# Patient Record
Sex: FEMALE | Race: WHITE | Employment: OTHER | URBAN - METROPOLITAN AREA
[De-identification: names, ages, dates, MRNs, and addresses within clinical notes are randomized per-mention and may not be internally consistent; named-entity substitution may affect disease eponyms.]

---

## 2017-04-21 DIAGNOSIS — E04.1 THYROID NODULE: ICD-10-CM

## 2017-04-21 DIAGNOSIS — D11.0 PLEOMORPHIC ADENOMA OF PAROTID GLAND: Primary | ICD-10-CM

## 2017-05-31 ENCOUNTER — OFFICE VISIT (OUTPATIENT)
Dept: OTOLARYNGOLOGY | Facility: CLINIC | Age: 62
End: 2017-05-31

## 2017-05-31 DIAGNOSIS — D11.0 PLEOMORPHIC ADENOMA OF PAROTID GLAND: Primary | ICD-10-CM

## 2017-05-31 DIAGNOSIS — E04.1 THYROID NODULE: ICD-10-CM

## 2017-05-31 ASSESSMENT — PAIN SCALES - GENERAL: PAINLEVEL: NO PAIN (0)

## 2017-05-31 NOTE — PATIENT INSTRUCTIONS
1.  You were seen in the ENT Clinic today by Dr. Devlin.  If you have any questions or concerns after your appointment, please call [phone].  Press option #1 for scheduling related needs.  Press option #3 for Nurse advice.      Larissa HARRISN, RN  [facility] ENT   Head & Neck Surgery

## 2017-05-31 NOTE — MR AVS SNAPSHOT
After Visit Summary   2017    Moon Steve    MRN: [id number]           Patient Information     Date Of Birth          1955        Visit Information        Provider Department      2017 8:20 AM Vini Devlin MD [facility] Ear Nose and Throat        Today's Diagnoses     Pleomorphic adenoma of parotid gland    -  1    Thyroid nodule          Care Instructions    1.  You were seen in the ENT Clinic today by Dr. Devlin.  If you have any questions or concerns after your appointment, please call [phone].  Press option #1 for scheduling related needs.  Press option #3 for Nurse advice.      Larissa ARRINGTON, RN  [facility] ENT   Head & Neck Surgery               Follow-ups after your visit        Who to contact     Please call your clinic at [phone] to:    Ask questions about your health    Make or cancel appointments    Discuss your medicines    Learn about your test results    Speak to your doctor   If you have compliments or concerns about an experience at your clinic, or if you wish to file a complaint, please contact [facility] Patient Relations at [phone] or email us at [email or web address]         Additional Information About Your Visit        MyChart Information     [employer] is an electronic gateway that provides easy, online access to your medical records. With [employer], you can request a clinic appointment, read your test results, renew a prescription or communicate with your care team.     To sign up for [employer] visit the website at [email or web address]   You will be asked to enter the access code listed below, as well as some personal information. Please follow the directions to create your username and password.     Your access code is: [email or web address]  Expires: 2017  6:30 AM     Your access code will  in 90 days. If you need help or a new code, please contact your [facility]  Clinic or call [phone] for assistance.        Care EveryWhere ID     This is your Care EveryWhere ID. This could be used by other organizations to access your [facility] medical records  [id number]         Blood Pressure from Last 3 Encounters:   05/31/11 133/91    Weight from Last 3 Encounters:   03/23/16 59 kg (130 lb 1 oz)   03/18/15 58.6 kg (129 lb 1.6 oz)   03/05/14 58.3 kg (128 lb 9 oz)              Today, you had the following     No orders found for display         Today's Medication Changes          These changes are accurate as of: 5/31/17 11:59 PM.  If you have any questions, ask your nurse or doctor.               Stop taking these medicines if you haven't already. Please contact your care team if you have questions.     ERYTHROMYCIN BASE PO   Stopped by:  Vini Devlin MD                    Primary Care Provider Office Phone # Fax #    Heidi Cevallos [phone]       [facility] [place] [address] [place] [address]        Thank you!     Thank you for choosing [facility] EAR NOSE AND THROAT  for your care. Our goal is always to provide you with excellent care. Hearing back from our patients is one way we can continue to improve our services. Please take a few minutes to complete the written survey that you may receive in the mail after your visit with us. Thank you!             Your Updated Medication List - Protect others around you: Learn how to safely use, store and throw away your medicines at www.disposemymeds.org.          This list is accurate as of: 5/31/17 11:59 PM.  Always use your most recent med list.                   Brand Name Dispense Instructions for use    * multivitamin, therapeutic with minerals Tabs tablet      Take 1 tablet by mouth daily       * PRESERVISION AREDS PO      Take  by mouth. Take as Directed       PREVACID 30 MG CR capsule   Generic drug:  LANsoprazole      Take  by mouth daily.       simvastatin 20 MG tablet    ZOCOR     Take 20 mg by  mouth At Bedtime.       * Notice:  This list has 2 medication(s) that are the same as other medications prescribed for you. Read the directions carefully, and ask your doctor or other care provider to review them with you.

## 2017-05-31 NOTE — LETTER
5/31/2017       RE: Moon Steve  [address] [place] [address]     Dear Colleague,    Thank you for referring your patient, Moon Steve, to the [facility] EAR NOSE AND THROAT at [facility]. Please see a copy of my visit note below.    May 31, 2017    PRIOR ONCOLOGIC HISTORY:  Ms. Steve is status post completion of postoperative radiotherapy in March 2011 for a recurrent pleomorphic adenoma.  I had performed a revision right-sided parotidectomy on 12/02/2010 which had invasion of the tumor into the dermis.      HISTORY OF PRESENT ILLNESS:    seven years out and doing great.  She is golfing almost daily.  She is very much enjoying life right now.  Her  is here with her as well.  She had a thyroid ultrasound done yesterday that shows no change in the 7 mm mass for at least two years now.  This is stable.  Her neck CT was also done and demonstrates no evidence of recurrence, and there is no evidence of cervical metastasis.      PHYSICAL EXAMINATION:  She is in good spirits and looks great.  Her  was with her.  The parotid bed continues to be soft with prominent scarring and posterior angular depression.  However, this does not bother her.  The parotid beds are clear bilaterally.  The neck has no evidence of adenopathy.  Both ears were inspected and were clear as well.  There are no palpable nodes in the thyroid.      IMPRESSION AND PLAN:  Ms. Steve continues to do well at [age] years.  We will see her back in two to three years' time with a repeat set of scans.  We do not necessarily need to repeat the carotid ultrasound since she had it done previously, and there were no issues at five years.          Vini Devlin MD  Otolaryngology/Head & Neck Surgery  [phone]    cc:   Kevon Liu MD   [facility]   [address] [facility] [place]  [address]     Heidi Diaz    [facility]    [address] [place]  [address]

## 2017-05-31 NOTE — PROGRESS NOTES
May 31, 2017    PRIOR ONCOLOGIC HISTORY:  Ms. Steve is status post completion of postoperative radiotherapy in March 2011 for a recurrent pleomorphic adenoma.  I had performed a revision right-sided parotidectomy on 12/02/2010 which had invasion of the tumor into the dermis.      HISTORY OF PRESENT ILLNESS:    seven years out and doing great.  She is golfing almost daily.  She is very much enjoying life right now.  Her  is here with her as well.  She had a thyroid ultrasound done yesterday that shows no change in the 7 mm mass for at least two years now.  This is stable.  Her neck CT was also done and demonstrates no evidence of recurrence, and there is no evidence of cervical metastasis.      PHYSICAL EXAMINATION:  She is in good spirits and looks great.  Her  was with her.  The parotid bed continues to be soft with prominent scarring and posterior angular depression.  However, this does not bother her.  The parotid beds are clear bilaterally.  The neck has no evidence of adenopathy.  Both ears were inspected and were clear as well.  There are no palpable nodes in the thyroid.      IMPRESSION AND PLAN:  Ms. Steve continues to do well at [age] years.  We will see her back in two to three years' time with a repeat set of scans.  We do not necessarily need to repeat the carotid ultrasound since she had it done previously, and there were no issues at five years.          Vini Devlin MD  Otolaryngology/Head & Neck Surgery  [phone]              cc:   Kevon Liu MD   [facility] [address] [place]  [address]     Heidi Diaz    [facility] [address] [place]  [address]

## 2017-06-27 LAB — MAMMOGRAM: NORMAL

## 2018-04-16 ENCOUNTER — TELEPHONE (OUTPATIENT)
Dept: OTOLARYNGOLOGY | Facility: CLINIC | Age: 63
End: 2018-04-16

## 2018-04-16 DIAGNOSIS — D11.0 PLEOMORPHIC ADENOMA OF PAROTID GLAND: Primary | ICD-10-CM

## 2018-04-16 NOTE — TELEPHONE ENCOUNTER
[facility] Call Center    Phone Message    May a detailed message be left on voicemail: yes    Reason for Call: Order(s): Other:   Reason for requested: CT of neck and US of thyroid, pt wants to know if she'll need to get these images for her yearly visit this year with Dr. Devlin in June.  Date needed: By June   Provider name: Dr. Devlin      Action Taken: ENT

## 2018-04-17 NOTE — TELEPHONE ENCOUNTER
"Dr Devlin's last note as is follows from 5/2017:  \"Ms. Steve continues to do well at [age] years.  We will see her back in two to three years' time with a repeat set of scans.  We do not necessarily need to repeat the carotid ultrasound since she had it done previously, and there were no issues at five years. \"    Left message for patient regarding Dr Devlin's recommendations of last year, a so she doesn't need to RTC this year for scans, these can be done next year( 2 years) or at 3 years. Also that we would place orders if there had been any changes, could certainly get scans done sooner.  Also reviewed care coordination now done by Marya Gipson.    Spoke with patient and she understands. She will likely come in in June with Dr Devlin, for exam only as scheduled.  She has no symptoms nor concerns.  "

## 2018-06-06 ENCOUNTER — EXTERNAL ORDER RESULTS (OUTPATIENT)
Dept: CARE COORDINATION | Facility: CLINIC | Age: 63
End: 2018-06-06

## 2018-06-13 ENCOUNTER — OFFICE VISIT (OUTPATIENT)
Dept: OTOLARYNGOLOGY | Facility: CLINIC | Age: 63
End: 2018-06-13
Payer: COMMERCIAL

## 2018-06-13 VITALS — HEIGHT: 65 IN | WEIGHT: 130 LBS | BODY MASS INDEX: 21.66 KG/M2

## 2018-06-13 DIAGNOSIS — D11.0 PLEOMORPHIC ADENOMA OF PAROTID GLAND: Primary | ICD-10-CM

## 2018-06-13 ASSESSMENT — PAIN SCALES - GENERAL: PAINLEVEL: NO PAIN (0)

## 2018-06-13 NOTE — PROGRESS NOTES
June 13, 2018        PRIOR ONCOLOGIC HISTORY:  Ms. Steve is status post completion of postoperative radiotherapy in March 2011 for a recurrent pleomorphic adenoma.  I had performed a revision right-sided parotidectomy on 12/02/2010 which had invasion of the tumor into the dermis.      HISTORY OF PRESENT ILLNESS:    Ms. Steve is doing well.  She is getting ready to go golfing this afternoon.  She and her  just purchased a new home in [place], or in the process of moving in.     She is here today primarily to make sure that her symptoms are not to be concerned about.  She notices occasionally that she will have a charley horse in the right side of the neck and she points to her sternocleidomastoid muscle.      She does not notice any new lumps or bumps.  She has been swallowing well.  There are no other concerns.  She wants to know whether or not we should get a CT scan.      PHYSICAL EXAMINATION:  She is accompanied by her  today.  They are both in good spirits.  Her parotid bed is completely clear.  The neck has no evidence of adenopathy.  The upper portion of the SCM muscles a little bit more prominent and a little bit more scar but I feel no masses.  The rest of the neck is negative.  Her facial nerve is working beautifully.  Both ears are clear.  The tympanic membranes are clear bilaterally.     IMPRESSION AND PLAN:    Ms. Steve is now 8 years out and JORDAN.   We will scan her in 1-2 years.  We talked about neck stretching exercises to avoid the charley horses.  I will see her back in one year's time as long as there are no other issues.       Vini Devlin MD  Otolaryngology/Head & Neck Surgery  [phone]              cc:   Kevon Liu MD   [facility]   [address] [place]  [address]     Heidi Cevallos-Tonie    [facility]    [address] [place]  [address]

## 2018-06-13 NOTE — MR AVS SNAPSHOT
"              After Visit Summary   2018    Moon Steve    MRN: [id number]           Patient Information     Date Of Birth          1955        Visit Information        Provider Department      2018 8:20 AM Vini Devlin MD [facility] Ear Nose and Throat        Today's Diagnoses     Pleomorphic adenoma of parotid gland    -  1      Care Instructions    Follow up with Dr. devlin in 1 year  Call the ENt clinic if ?'s arise           Follow-ups after your visit        Who to contact     Please call your clinic at [phone] to:    Ask questions about your health    Make or cancel appointments    Discuss your medicines    Learn about your test results    Speak to your doctor            Additional Information About Your Visit        MyChart Information     [employer] is an electronic gateway that provides easy, online access to your medical records. With [employer], you can request a clinic appointment, read your test results, renew a prescription or communicate with your care team.     To sign up for [employer] visit the website at [email or web address]   You will be asked to enter the access code listed below, as well as some personal information. Please follow the directions to create your username and password.     Your access code is: [email or web address]  Expires: 2018 10:35 AM     Your access code will  in 90 days. If you need help or a new code, please contact your [facility] or call [phone] for assistance.        Care EveryWhere ID     This is your Care EveryWhere ID. This could be used by other organizations to access your [place] medical records  [id number]        Your Vitals Were     Height BMI (Body Mass Index)                1.651 m (5' 5\") 21.63 kg/m2           Blood Pressure from Last 3 Encounters:   11 133/91    Weight from Last 3 Encounters:   18 59 kg (130 lb)   16 59 kg (130 lb 1 oz)   03/18/15 58.6 kg (129 lb 1.6 oz)            "   Today, you had the following     No orders found for display       Primary Care Provider Office Phone # Fax #    Heidi Cevallos [phone]       [facility] [address] [address]        Equal Access to Services     JASONLAKHWINDER SUZAN : Hadii aad ku hadasho Soomaali, waaxda luqadaha, qaybta kaalmada adeegyada, waxay anthonyin hayariann adezachery juanito rivka gaines. So [facility] [phone].    ATENCIÓN: Si habla español, tiene a kyle disposición servicios gratuitos de asistencia lingüística. Llame al [phone].    We comply with applicable federal civil rights laws and [place] laws. We do not discriminate on the basis of race, color, national origin, age, disability, sex, sexual orientation, or gender identity.            Thank you!     Thank you for choosing [facility] EAR NOSE AND THROAT  for your care. Our goal is always to provide you with excellent care. Hearing back from our patients is one way we can continue to improve our services. Please take a few minutes to complete the written survey that you may receive in the mail after your visit with us. Thank you!             Your Updated Medication List - Protect others around you: Learn how to safely use, store and throw away your medicines at www.disposemymeds.org.          This list is accurate as of 6/13/18  8:35 AM.  Always use your most recent med list.                   Brand Name Dispense Instructions for use Diagnosis    * multivitamin, therapeutic with minerals Tabs tablet      Take 1 tablet by mouth daily    Parotid adenoma       * PRESERVISION AREDS PO      Take  by mouth. Take as Directed        PREVACID 30 MG CR capsule   Generic drug:  LANsoprazole      Take  by mouth daily.        simvastatin 20 MG tablet    ZOCOR     Take 20 mg by mouth At Bedtime.        * Notice:  This list has 2 medication(s) that are the same as other medications prescribed for you. Read the directions carefully, and ask your doctor or other care  provider to review them with you.

## 2018-06-13 NOTE — LETTER
6/13/2018       RE: Moon Steve  [address]  [place] [address]     Dear Colleague,    Thank you for referring your patient, Moon Steve, to the [facility] EAR NOSE AND THROAT at [facility]. Please see a copy of my visit note below.    June 13, 2018        PRIOR ONCOLOGIC HISTORY:  Ms. Steve is status post completion of postoperative radiotherapy in March 2011 for a recurrent pleomorphic adenoma.  I had performed a revision right-sided parotidectomy on 12/02/2010 which had invasion of the tumor into the dermis.      HISTORY OF PRESENT ILLNESS:    Ms. Steve is doing well.  She is getting ready to go golfing this afternoon.  She and her  just purchased a new home in [place], or in the process of moving in.     She is here today primarily to make sure that her symptoms are not to be concerned about.  She notices occasionally that she will have a charley horse in the right side of the neck and she points to her sternocleidomastoid muscle.      She does not notice any new lumps or bumps.  She has been swallowing well.  There are no other concerns.  She wants to know whether or not we should get a CT scan.      PHYSICAL EXAMINATION:  She is accompanied by her  today.  They are both in good spirits.  Her parotid bed is completely clear.  The neck has no evidence of adenopathy.  The upper portion of the SCM muscles a little bit more prominent and a little bit more scar but I feel no masses.  The rest of the neck is negative.  Her facial nerve is working beautifully.  Both ears are clear.  The tympanic membranes are clear bilaterally.     IMPRESSION AND PLAN:    Ms. Steve is now 8 years out and JORDAN.   We will scan her in 1-2 years.  We talked about neck stretching exercises to avoid the charley horses.  I will see her back in one year's time as long as there are no other issues.       Vini Devlin MD  Otolaryngology/Head & Neck  Surgery  [phone]    cc:   Kevon Liu MD   [facility]   [address] [facility] [place]  [address]     Heidi Diaz    [facility]    [address] [place]  [address]

## 2018-06-13 NOTE — NURSING NOTE
Chief Complaint   Patient presents with     RECHECK     Routine thyroid check     Jordy Denton EMT

## 2019-05-22 ENCOUNTER — TELEPHONE (OUTPATIENT)
Dept: OTOLARYNGOLOGY | Facility: CLINIC | Age: 64
End: 2019-05-22

## 2019-05-22 DIAGNOSIS — E04.1 THYROID NODULE: Primary | ICD-10-CM

## 2019-05-22 NOTE — TELEPHONE ENCOUNTER
Left vm message for patient advising her that orders for a thyroid ultrasound were put in and she can reach out to radiology to schedule. Phone number for radiology scheduling as well as our call back number left on voicemail.

## 2019-05-22 NOTE — TELEPHONE ENCOUNTER
[facility] Call Center    Phone Message    May a detailed message be left on voicemail: yes    Reason for Call: Other: pt request orders for her annual thyroid check. she plans to meet with Dr. Devlin in July 2019. Please call pt when orders have been made.      Action Taken: Message routed to:  [facility] ent

## 2019-06-10 ENCOUNTER — DOCUMENTATION ONLY (OUTPATIENT)
Dept: CARE COORDINATION | Facility: CLINIC | Age: 64
End: 2019-06-10

## 2019-07-08 ENCOUNTER — ANCILLARY PROCEDURE (OUTPATIENT)
Dept: ULTRASOUND IMAGING | Facility: CLINIC | Age: 64
End: 2019-07-08
Attending: OTOLARYNGOLOGY
Payer: COMMERCIAL

## 2019-07-08 DIAGNOSIS — E04.1 THYROID NODULE: ICD-10-CM

## 2019-07-10 ENCOUNTER — OFFICE VISIT (OUTPATIENT)
Dept: OTOLARYNGOLOGY | Facility: CLINIC | Age: 64
End: 2019-07-10
Payer: COMMERCIAL

## 2019-07-10 VITALS
SYSTOLIC BLOOD PRESSURE: 173 MMHG | BODY MASS INDEX: 21.49 KG/M2 | HEART RATE: 65 BPM | TEMPERATURE: 98.1 F | DIASTOLIC BLOOD PRESSURE: 87 MMHG | WEIGHT: 129 LBS | HEIGHT: 65 IN | RESPIRATION RATE: 20 BRPM

## 2019-07-10 DIAGNOSIS — D11.0 PLEOMORPHIC ADENOMA OF PAROTID GLAND: ICD-10-CM

## 2019-07-10 DIAGNOSIS — E04.1 THYROID NODULE: Primary | ICD-10-CM

## 2019-07-10 ASSESSMENT — PAIN SCALES - GENERAL: PAINLEVEL: NO PAIN (0)

## 2019-07-10 ASSESSMENT — MIFFLIN-ST. JEOR: SCORE: 1124.14

## 2019-07-10 NOTE — PATIENT INSTRUCTIONS
1. You were seen in the ENT Clinic today by Dr. Devlin.  If you have any questions or concerns after your appointment, please call   - Option 1: ENT Clinic: [phone]  - Option 2: Sarbjit (Dr. Devlin's Nurse): [phone]    2. Plan to return to clinic in 1-2 years     Natice Schwab, RN  [facility] Otolaryngology  [phone]

## 2019-07-10 NOTE — LETTER
7/10/2019       RE: Moon Steve  [address]  [place] [address]     Dear Colleague,    Thank you for referring your patient, Moon Steve, to the [facility] EAR NOSE AND THROAT at [facility]. Please see a copy of my visit note below.    Jul 10, 2019        PRIOR ONCOLOGIC HISTORY:  Ms. Steve is status post completion of postoperative radiotherapy in March 2011 for a recurrent pleomorphic adenoma.  I had performed a revision right-sided parotidectomy on 12/02/2010 which had invasion of the tumor into the dermis.      HISTORY OF PRESENT ILLNESS:    Ms. Steve continues to do well.  There have been no difficulties recently.  She had an ultrasound scan yesterday that showed no change in her small nodules throughout her thyroid over the past three to four years.      She feels like the charley horses that she had at her last visit have improved.  She does not notice any new symptoms.  She has not had any dysphagia or pain or new lumps or bumps.     PHYSICAL EXAMINATION:  On examination, she is accompanied by her .  They are in good spirits as usual.  She is very thin.  It is easy to examine her parotid bed which is clear. The thyroid bed does not show any palpable masses.  The neck has no evidence of adenopathy.  She asked me to take a look at her ears and both ears are clean with clear tympanic membranes and external auditory canals.      IMPRESSION AND PLAN:  Ms. Steve is now nearly nine years out and JORDAN.  I told her that we do not have to scan her every year and that we can go back to probably every two to three years.  She will follow up with me in two years.  If there are any issues sooner than that, they were introduced to Natice Schwab so that they can contact her.       Vini Devlin MD  Otolaryngology/Head & Neck Surgery  [phone]    cc:   Kevon Liu MD   [facility]   [address] [place]   [address]     [facility]    [address] [place]  [address]         Again, thank you for allowing me to participate in the care of your patient.      Sincerely,    Vini Devlin MD

## 2019-07-10 NOTE — NURSING NOTE
"Chief Complaint   Patient presents with     RECHECK     yearly thyroid check     Blood pressure 173/87, pulse 65, temperature 98.1  F (36.7  C), resp. rate 20, height 1.64 m (5' 4.57\"), weight 58.5 kg (129 lb).    Obdulio Marshall LPN    "

## 2019-07-10 NOTE — PROGRESS NOTES
Jul 10, 2019        PRIOR ONCOLOGIC HISTORY:  Ms. Steve is status post completion of postoperative radiotherapy in March 2011 for a recurrent pleomorphic adenoma.  I had performed a revision right-sided parotidectomy on 12/02/2010 which had invasion of the tumor into the dermis.      HISTORY OF PRESENT ILLNESS:    Ms. Steve continues to do well.  There have been no difficulties recently.  She had an ultrasound scan yesterday that showed no change in her small nodules throughout her thyroid over the past three to four years.      She feels like the charley horses that she had at her last visit have improved.  She does not notice any new symptoms.  She has not had any dysphagia or pain or new lumps or bumps.     PHYSICAL EXAMINATION:  On examination, she is accompanied by her .  They are in good spirits as usual.  She is very thin.  It is easy to examine her parotid bed which is clear. The thyroid bed does not show any palpable masses.  The neck has no evidence of adenopathy.  She asked me to take a look at her ears and both ears are clean with clear tympanic membranes and external auditory canals.      IMPRESSION AND PLAN:  Ms. Steve is now nearly nine years out and JORDAN.  I told her that we do not have to scan her every year and that we can go back to probably every two to three years.  She will follow up with me in two years.  If there are any issues sooner than that, they were introduced to Natice Schwab so that they can contact her.       Vini Devlin MD  Otolaryngology/Head & Neck Surgery  [phone]              cc:   Kevon Liu MD   [facility] [address] [place]  [address]     Heidi Diaz    [facility]    [address] [place]  [address]

## 2021-07-14 DIAGNOSIS — E04.1 THYROID NODULE: Primary | ICD-10-CM

## 2021-07-15 ENCOUNTER — TELEPHONE (OUTPATIENT)
Dept: OTOLARYNGOLOGY | Facility: CLINIC | Age: 66
End: 2021-07-15

## 2021-08-03 ENCOUNTER — HOSPITAL ENCOUNTER (OUTPATIENT)
Dept: ULTRASOUND IMAGING | Facility: CLINIC | Age: 66
Discharge: HOME | End: 2021-08-03
Attending: OTOLARYNGOLOGY | Admitting: OTOLARYNGOLOGY
Payer: COMMERCIAL

## 2021-08-03 DIAGNOSIS — E04.1 THYROID NODULE: ICD-10-CM

## 2021-08-03 PROCEDURE — 76536 US EXAM OF HEAD AND NECK: CPT

## 2021-08-10 ENCOUNTER — OFFICE VISIT (OUTPATIENT)
Dept: OTOLARYNGOLOGY | Facility: CLINIC | Age: 66
End: 2021-08-10
Payer: COMMERCIAL

## 2021-08-10 VITALS
WEIGHT: 123.46 LBS | HEIGHT: 65 IN | HEART RATE: 80 BPM | BODY MASS INDEX: 20.57 KG/M2 | TEMPERATURE: 97.5 F | OXYGEN SATURATION: 97 %

## 2021-08-10 DIAGNOSIS — E04.1 THYROID NODULE: Primary | ICD-10-CM

## 2021-08-10 DIAGNOSIS — D11.0 BENIGN NEOPLASM PAROTID GLAND: ICD-10-CM

## 2021-08-10 PROCEDURE — 99213 OFFICE O/P EST LOW 20 MIN: CPT | Performed by: REGISTERED NURSE

## 2021-08-10 RX ORDER — LOSARTAN POTASSIUM 50 MG/1
50 TABLET ORAL
COMMUNITY
Start: 2021-07-13 | End: 2022-07-13

## 2021-08-10 RX ORDER — HYDROCHLOROTHIAZIDE 12.5 MG/1
12.5 TABLET ORAL
COMMUNITY
Start: 2021-07-13 | End: 2022-07-13

## 2021-08-10 ASSESSMENT — MIFFLIN-ST. JEOR: SCORE: 1095.88

## 2021-08-10 ASSESSMENT — PAIN SCALES - GENERAL: PAINLEVEL: NO PAIN (0)

## 2021-08-10 NOTE — PATIENT INSTRUCTIONS
- You were seen in the ENT Clinic today by Sylvia Ernst NP.   - Follow up in 1 year for continued surveillance.  - Please send a [employer] message or call the ENT clinic at [phone] with any questions or concerns.

## 2021-08-10 NOTE — NURSING NOTE
"Chief Complaint   Patient presents with     RECHECK     follow up//former Dr. Devlin pt       Pulse 80, temperature 97.5  F (36.4  C), temperature source Temporal, height 1.651 m (5' 5\"), weight 56 kg (123 lb 7.3 oz), SpO2 97 %.    Elza Martinez, EMT    "

## 2021-08-10 NOTE — LETTER
8/10/2021       RE: Moon Steve  [address]  [place] [address]     Dear Colleague,    Thank you for referring your patient, Moon Steve, to the [facility] [place] at [facility]. Please see a copy of my visit note below.    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 Patient had a revision right-sided parotidectomy by Dr. Devlin on 12/02/2010. There was invasion into the dermis at this time. Patient completed postoperative radiation in March 2011. She continues to follow annually with last CT scan in 2017. Dr. Devlin has been following thyroid nodules with every 2-3 year ultrasounds. Most recent US was on 8/3/21 which reported indeterminate thyroid nodules that have not changed from previous imaging.    Interval History:   Patient comes in today with her  for routine surveillance. States that she is doing well. Continues to have xerostomia but it is not affecting her ability to swallow or take oral intake. Patient does state that she is less interested in food but that is not due to radiation side effects.     Patient denies any dysphagia, odynophagia, new sore throat, mouth pain, ear pain, bleeding from the mouth, hemoptysis, voice changes, facial numbness/weakness or lumps/bumps of the neck.    Past Medical History:  No past medical history on file.    Past Surgical History:  No past surgical history on file.    Medications:  Current Outpatient Medications   Medication Sig Dispense Refill     hydrochlorothiazide (HYDRODIURIL) 12.5 MG tablet Take 12.5 mg by mouth       LANsoprazole (PREVACID) 30 MG capsule Take  by mouth daily.       losartan (COZAAR) 50 MG tablet Take 50 mg by mouth       Multiple Vitamins-Minerals (ICAPS AREDS 2 PO)        Multiple Vitamins-Minerals (PRESERVISION AREDS PO)  "Take  by mouth. Take as Directed       multivitamin, therapeutic with minerals (THERA-VIT-M) TABS Take 1 tablet by mouth daily       simvastatin (ZOCOR) 20 MG tablet Take 20 mg by mouth At Bedtime.       Allergies:  No Known Allergies     Social History:  Social History     Tobacco Use     Smoking status: Never Smoker     Smokeless tobacco: Never Used   Substance Use Topics     Alcohol use: Yes     Comment: few times per week     Drug use: Not on file     ROS: 10 point ROS neg other than the symptoms noted above in the HPI.    Physical Exam:    Pulse 80   Temp 97.5  F (36.4  C) (Temporal)   Ht 1.651 m (5' 5\")   Wt 56 kg (123 lb 7.3 oz)   SpO2 97%   BMI 20.54 kg/m    Wt Readings from Last 3 Encounters:   08/10/21 56 kg (123 lb 7.3 oz)   07/10/19 58.5 kg (129 lb)   18 59 kg (130 lb)        Constitutional:  The patient was well-groomed and in no acute distress.     Skin: Normal:  warm and pink without rash    Neurologic: Alert and oriented x 3.  CN's III-XII within normal limits.  Voice normal.    Psychiatric: The patient's affect was calm, cooperative, and appropriate.     Communication:  Normal; communicates verbally, normal voice quality.    Respiratory: Breathing comfortably without stridor or exertion of accessory muscles.    Head/Face:  Normocephalic and atraumatic.  Right parotidectomy incision is well healed without masses on palpation.   Oral Cavity: Normal tongue, floor of mouth, buccal mucosa, and palate.  No lesions or masses on inspection or palpation. *   Oropharynx: Normal mucosa, palate symmetric with normal elevation. No abnormal lymph tissue in the oropharynx.   Neck: Supple with normal laryngeal and tracheal landmarks.  The parotid beds were without masses.  No palpable thyroid.  Normal range of motion.    Lymphatic: There is no palpable lymphadenopathy in the neck.      Labs and Imaging Reviewed:  Imagin/3/21  US Thyroid  IMPRESSION:  1.  A single subcentimeter indeterminant thyroid " nodule. No  significant change from previous.  2.  No FNA indicated.    Assessment/Plan:  1. 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 She is doing well without any evidence of disease on today's exam. Discussed further surveillance including optional annual clinic visits which she would like to continue. Will not plan for any further imaging unless new symptoms occur including any new lumps/bumps, numbness/weakness of face, ear pain or change in swallowing.    2. Thyroid nodule  Patient with history of indeterminate thyroid nodules that have remained stable over multiple years. No further imaging needed at this time.       Reviewed signs and symptoms that would necessitate a sooner exam including new sore throat, mouth pain, ear pain, dysphagia, bleeding from the mouth or lumps/bumps of the neck.    Otherwise, patient will follow up in 1 year for continued surveillance.    Sylvia Ernst DNP, APRN, CNP  Otolaryngology  Head & Neck Surgery  [phone]    30 minutes spent on the date of the encounter doing chart review, history and exam, documentation and further activities per the note        Again, thank you for allowing me to participate in the care of your patient.      Sincerely,    Sarah Ernst, NP

## 2021-08-16 NOTE — PROGRESS NOTES
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 Patient had a revision right-sided parotidectomy by Dr. Devlin on 12/02/2010. There was invasion into the dermis at this time. Patient completed postoperative radiation in March 2011. She continues to follow annually with last CT scan in 2017. Dr. Devlin has been following thyroid nodules with every 2-3 year ultrasounds. Most recent US was on 8/3/21 which reported indeterminate thyroid nodules that have not changed from previous imaging.    Interval History:   Patient comes in today with her  for routine surveillance. States that she is doing well. Continues to have xerostomia but it is not affecting her ability to swallow or take oral intake. Patient does state that she is less interested in food but that is not due to radiation side effects.     Patient denies any dysphagia, odynophagia, new sore throat, mouth pain, ear pain, bleeding from the mouth, hemoptysis, voice changes, facial numbness/weakness or lumps/bumps of the neck.    Past Medical History:  No past medical history on file.    Past Surgical History:  No past surgical history on file.    Medications:  Current Outpatient Medications   Medication Sig Dispense Refill     hydrochlorothiazide (HYDRODIURIL) 12.5 MG tablet Take 12.5 mg by mouth       LANsoprazole (PREVACID) 30 MG capsule Take  by mouth daily.       losartan (COZAAR) 50 MG tablet Take 50 mg by mouth       Multiple Vitamins-Minerals (ICAPS AREDS 2 PO)        Multiple Vitamins-Minerals (PRESERVISION AREDS PO) Take  by mouth. Take as Directed       multivitamin, therapeutic with minerals (THERA-VIT-M) TABS Take 1 tablet by mouth daily       simvastatin (ZOCOR) 20 MG tablet Take 20 mg by mouth At Bedtime.       Allergies:  No Known Allergies     Social History:  Social History     Tobacco Use     Smoking status: Never Smoker     Smokeless  "tobacco: Never Used   Substance Use Topics     Alcohol use: Yes     Comment: few times per week     Drug use: Not on file     ROS: 10 point ROS neg other than the symptoms noted above in the HPI.    Physical Exam:    Pulse 80   Temp 97.5  F (36.4  C) (Temporal)   Ht 1.651 m (5' 5\")   Wt 56 kg (123 lb 7.3 oz)   SpO2 97%   BMI 20.54 kg/m    Wt Readings from Last 3 Encounters:   08/10/21 56 kg (123 lb 7.3 oz)   07/10/19 58.5 kg (129 lb)   18 59 kg (130 lb)        Constitutional:  The patient was well-groomed and in no acute distress.     Skin: Normal:  warm and pink without rash    Neurologic: Alert and oriented x 3.  CN's III-XII within normal limits.  Voice normal.    Psychiatric: The patient's affect was calm, cooperative, and appropriate.     Communication:  Normal; communicates verbally, normal voice quality.    Respiratory: Breathing comfortably without stridor or exertion of accessory muscles.    Head/Face:  Normocephalic and atraumatic.  Right parotidectomy incision is well healed without masses on palpation.   Oral Cavity: Normal tongue, floor of mouth, buccal mucosa, and palate.  No lesions or masses on inspection or palpation. *   Oropharynx: Normal mucosa, palate symmetric with normal elevation. No abnormal lymph tissue in the oropharynx.   Neck: Supple with normal laryngeal and tracheal landmarks.  The parotid beds were without masses.  No palpable thyroid.  Normal range of motion.    Lymphatic: There is no palpable lymphadenopathy in the neck.      Labs and Imaging Reviewed:  Imagin/3/21  US Thyroid  IMPRESSION:  1.  A single subcentimeter indeterminant thyroid nodule. No  significant change from previous.  2.  No FNA indicated.    Assessment/Plan:  1. 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 She is doing well without any evidence of disease on today's exam. Discussed further surveillance " including optional annual clinic visits which she would like to continue. Will not plan for any further imaging unless new symptoms occur including any new lumps/bumps, numbness/weakness of face, ear pain or change in swallowing.    2. Thyroid nodule  Patient with history of indeterminate thyroid nodules that have remained stable over multiple years. No further imaging needed at this time.       Reviewed signs and symptoms that would necessitate a sooner exam including new sore throat, mouth pain, ear pain, dysphagia, bleeding from the mouth or lumps/bumps of the neck.    Otherwise, patient will follow up in 1 year for continued surveillance.    Sylvia Ernst DNP, APRN, CNP  Otolaryngology  Head & Neck Surgery  [phone]    30 minutes spent on the date of the encounter doing chart review, history and exam, documentation and further activities per the note